# Patient Record
Sex: FEMALE | Race: ASIAN | NOT HISPANIC OR LATINO
[De-identification: names, ages, dates, MRNs, and addresses within clinical notes are randomized per-mention and may not be internally consistent; named-entity substitution may affect disease eponyms.]

---

## 2023-06-26 PROBLEM — Z00.00 ENCOUNTER FOR PREVENTIVE HEALTH EXAMINATION: Status: ACTIVE | Noted: 2023-06-26

## 2023-06-27 ENCOUNTER — APPOINTMENT (OUTPATIENT)
Dept: GASTROENTEROLOGY | Facility: CLINIC | Age: 58
End: 2023-06-27
Payer: COMMERCIAL

## 2023-06-27 VITALS
RESPIRATION RATE: 14 BRPM | HEART RATE: 79 BPM | OXYGEN SATURATION: 99 % | SYSTOLIC BLOOD PRESSURE: 124 MMHG | DIASTOLIC BLOOD PRESSURE: 86 MMHG | TEMPERATURE: 98.2 F

## 2023-06-27 VITALS — WEIGHT: 149 LBS | HEIGHT: 64 IN | BODY MASS INDEX: 25.44 KG/M2

## 2023-06-27 DIAGNOSIS — U07.1 COVID-19: ICD-10-CM

## 2023-06-27 DIAGNOSIS — Z78.9 OTHER SPECIFIED HEALTH STATUS: ICD-10-CM

## 2023-06-27 DIAGNOSIS — Z12.11 ENCOUNTER FOR SCREENING FOR MALIGNANT NEOPLASM OF COLON: ICD-10-CM

## 2023-06-27 DIAGNOSIS — R19.8 OTHER SPECIFIED SYMPTOMS AND SIGNS INVOLVING THE DIGESTIVE SYSTEM AND ABDOMEN: ICD-10-CM

## 2023-06-27 DIAGNOSIS — R11.10 VOMITING, UNSPECIFIED: ICD-10-CM

## 2023-06-27 PROCEDURE — 99203 OFFICE O/P NEW LOW 30 MIN: CPT

## 2023-06-27 RX ORDER — SODIUM SULFATE, POTASSIUM SULFATE AND MAGNESIUM SULFATE 1.6; 3.13; 17.5 G/177ML; G/177ML; G/177ML
17.5-3.13-1.6 SOLUTION ORAL
Qty: 354 | Refills: 0 | Status: ACTIVE | COMMUNITY
Start: 2023-06-27 | End: 1900-01-01

## 2023-06-27 NOTE — REASON FOR VISIT
[Initial Evaluation] : an initial evaluation [FreeTextEntry1] : colon cancer screening, occ regurgitation

## 2023-06-27 NOTE — REVIEW OF SYSTEMS
[As Noted in HPI] : as noted in HPI [Negative] : Heme/Lymph [FreeTextEntry7] : small pieces of stool

## 2023-06-27 NOTE — ASSESSMENT
[FreeTextEntry1] : 57-year-old female referred for colorectal cancer screening.  Her last colonoscopy was more than 10 years ago.  She states her stools have changed over the last year since she had COVID with smaller pieces.  She also has occasional regurgitation of whitish particles.  There is no significant GERD and there was no significant improvement with Pepcid OTC.  There is no weight loss or anorexia.  There is no chest pain or shortness of breath.  Currently takes vitamin D 5000 units daily.  No NSAID usage\par \par IMP:\par 1. colon cancer screening, average risk\par 2. Occ regurgitation, possible hiatal hernia\par \par PLAN:\par 1. She was advised to undergo colonoscopy to which she  agreed. The procedure will be performed in [facility] with the assistance of an anesthesiologist. The procedure was explained in detail and she understood the risks of the procedure not limited  to infection, bleeding, perforation, risk of anesthesia and risk of IV site problems,emergency surgery, missed lesions  or non-diagnosis of colon cancer. She  was advised that she could not drive home alone, if the patient chooses to receive sedation. Further diagnostic and treatment recommendations will be based upon the procedure and any biopsies, if they are taken.\par 2.elective Endoscopy if upper symptoms worsen.

## 2023-06-27 NOTE — CONSULT LETTER
[Dear  ___] : Dear  [unfilled], [Consult Letter:] : I had the pleasure of evaluating your patient, [unfilled]. [Please see my note below.] : Please see my note below. [Consult Closing:] : Thank you very much for allowing me to participate in the care of this patient.  If you have any questions, please do not hesitate to contact me. [Sincerely,] : Sincerely, [FreeTextEntry3] : Rogelio Garcias MD, FACP, AGAF, FAASLD\par  of Medicine\par [facility]\par

## 2023-06-27 NOTE — HISTORY OF PRESENT ILLNESS
[FreeTextEntry1] : 57-year-old female referred for colorectal cancer screening.  Her last colonoscopy was more than 10 years ago.  She states her stools have changed over the last year since she had COVID with smaller pieces.  She also has occasional regurgitation of whitish particles.  There is no significant GERD and there was no significant improvement with Pepcid OTC.  There is no weight loss or anorexia.  There is no chest pain or shortness of breath.  Currently takes vitamin D 5000 units daily.  No NSAID usage

## 2023-09-27 ENCOUNTER — APPOINTMENT (OUTPATIENT)
Dept: GASTROENTEROLOGY | Facility: AMBULATORY SURGERY CENTER | Age: 58
End: 2023-09-27

## 2025-05-22 ENCOUNTER — NON-APPOINTMENT (OUTPATIENT)
Age: 60
End: 2025-05-22

## 2025-07-24 ENCOUNTER — APPOINTMENT (OUTPATIENT)
Dept: PULMONOLOGY | Facility: CLINIC | Age: 60
End: 2025-07-24
Payer: COMMERCIAL

## 2025-07-24 VITALS
DIASTOLIC BLOOD PRESSURE: 70 MMHG | SYSTOLIC BLOOD PRESSURE: 122 MMHG | OXYGEN SATURATION: 99 % | HEART RATE: 64 BPM | TEMPERATURE: 97.3 F | HEIGHT: 64 IN | WEIGHT: 147 LBS | BODY MASS INDEX: 25.1 KG/M2

## 2025-07-24 DIAGNOSIS — R05.3 CHRONIC COUGH: ICD-10-CM

## 2025-07-24 DIAGNOSIS — R94.2 ABNORMAL RESULTS OF PULMONARY FUNCTION STUDIES: ICD-10-CM

## 2025-07-24 PROCEDURE — 99204 OFFICE O/P NEW MOD 45 MIN: CPT | Mod: 25

## 2025-07-24 PROCEDURE — 94729 DIFFUSING CAPACITY: CPT

## 2025-07-24 PROCEDURE — 94727 GAS DIL/WSHOT DETER LNG VOL: CPT

## 2025-07-24 PROCEDURE — 94060 EVALUATION OF WHEEZING: CPT
